# Patient Record
Sex: MALE | Race: BLACK OR AFRICAN AMERICAN | URBAN - METROPOLITAN AREA
[De-identification: names, ages, dates, MRNs, and addresses within clinical notes are randomized per-mention and may not be internally consistent; named-entity substitution may affect disease eponyms.]

---

## 2017-01-12 ENCOUNTER — TRANSFERRED RECORDS (OUTPATIENT)
Dept: HEALTH INFORMATION MANAGEMENT | Facility: CLINIC | Age: 57
End: 2017-01-12

## 2017-03-30 ENCOUNTER — TRANSFERRED RECORDS (OUTPATIENT)
Dept: HEALTH INFORMATION MANAGEMENT | Facility: CLINIC | Age: 57
End: 2017-03-30

## 2017-05-18 ENCOUNTER — TRANSFERRED RECORDS (OUTPATIENT)
Dept: HEALTH INFORMATION MANAGEMENT | Facility: CLINIC | Age: 57
End: 2017-05-18

## 2017-06-15 ENCOUNTER — TRANSFERRED RECORDS (OUTPATIENT)
Dept: HEALTH INFORMATION MANAGEMENT | Facility: CLINIC | Age: 57
End: 2017-06-15

## 2017-06-16 ENCOUNTER — TRANSFERRED RECORDS (OUTPATIENT)
Dept: HEALTH INFORMATION MANAGEMENT | Facility: CLINIC | Age: 57
End: 2017-06-16

## 2017-08-01 ENCOUNTER — TRANSFERRED RECORDS (OUTPATIENT)
Dept: HEALTH INFORMATION MANAGEMENT | Facility: CLINIC | Age: 57
End: 2017-08-01

## 2017-08-07 DIAGNOSIS — M54.16 LUMBAR RADICULAR PAIN: Primary | ICD-10-CM

## 2017-08-08 ENCOUNTER — HOSPITAL ENCOUNTER (OUTPATIENT)
Dept: GENERAL RADIOLOGY | Facility: CLINIC | Age: 57
End: 2017-08-08
Attending: NEUROLOGICAL SURGERY
Payer: COMMERCIAL

## 2017-08-08 ENCOUNTER — OFFICE VISIT (OUTPATIENT)
Dept: NEUROSURGERY | Facility: CLINIC | Age: 57
End: 2017-08-08
Attending: NEUROLOGICAL SURGERY
Payer: COMMERCIAL

## 2017-08-08 ENCOUNTER — HOSPITAL ENCOUNTER (OUTPATIENT)
Dept: MRI IMAGING | Facility: CLINIC | Age: 57
Discharge: HOME | End: 2017-08-08
Attending: NEUROLOGICAL SURGERY | Admitting: NEUROLOGICAL SURGERY
Payer: COMMERCIAL

## 2017-08-08 VITALS
HEART RATE: 80 BPM | SYSTOLIC BLOOD PRESSURE: 115 MMHG | OXYGEN SATURATION: 100 % | HEIGHT: 71 IN | WEIGHT: 222 LBS | DIASTOLIC BLOOD PRESSURE: 74 MMHG | TEMPERATURE: 97.4 F | BODY MASS INDEX: 31.08 KG/M2

## 2017-08-08 DIAGNOSIS — M54.16 LUMBAR RADICULAR PAIN: Primary | ICD-10-CM

## 2017-08-08 DIAGNOSIS — M54.16 LUMBAR RADICULAR PAIN: ICD-10-CM

## 2017-08-08 PROCEDURE — 72110 X-RAY EXAM L-2 SPINE 4/>VWS: CPT

## 2017-08-08 PROCEDURE — 99211 OFF/OP EST MAY X REQ PHY/QHP: CPT | Performed by: NEUROLOGICAL SURGERY

## 2017-08-08 PROCEDURE — 99204 OFFICE O/P NEW MOD 45 MIN: CPT | Performed by: NEUROLOGICAL SURGERY

## 2017-08-08 PROCEDURE — 72082 X-RAY EXAM ENTIRE SPI 2/3 VW: CPT

## 2017-08-08 PROCEDURE — 72148 MRI LUMBAR SPINE W/O DYE: CPT

## 2017-08-08 NOTE — PATIENT INSTRUCTIONS
-EMG right leg with Dr. Flores in [place] [address] on 8/9/17 8:30 am. Please check in at 8:20 am.     Do not wear Jewelry or metal.     Shower the day of the procedure.     Do not wear lotions or creams.      -We will contact you with results of EMG.       -Please call clinic at [phone] if you wish to proceed with surgery.

## 2017-08-08 NOTE — NURSING NOTE
Leobardo Brown is a 57 year old male who presents for:  Chief Complaint   Patient presents with     Neurologic Problem     referral from Dr. ABBY Trotter for  2nd opinion        Initial Vitals:  There were no vitals taken for this visit. There is no height or weight on file to calculate BMI.. There is no height or weight on file to calculate BSA. BP completed using cuff size: regular  Data Unavailable    Do you feel safe in your environment?  Yes  Do you need any refills today? No    Nursing Comments: referral from Dr. ABBY Trotter for  2nd opinion.  Patient rates his pain today as 9 right leg ankle      5 min. nursing intake time  Willa Jefferson CMA, AAS      Discharge plan:   -EMG right leg with Dr. Flores in [place] [address] on 8/9/17 8:30 am. Please check in at 8:20 am.     Do not wear Jewelry or metal.     Shower the day of the procedure.     Do not wear lotions or creams.      -We will contact you with results of EMG.       -Please call clinic at [phone] if you wish to proceed with surgery.       2 min. nursing discharge time  Willa Jefferson CMA, AAS

## 2017-08-08 NOTE — MR AVS SNAPSHOT
After Visit Summary   8/8/2017    Leobardo Brown    MRN: [id number]           Patient Information     Date Of Birth          1960        Visit Information        Provider Department      8/8/2017 9:00 AM David Jackson MD [facility]        Care Instructions    -EMG right leg with Dr. Flores in [place] [address] on 8/9/17 8:30 am. Please check in at 8:20 am.     Do not wear Jewelry or metal.     Shower the day of the procedure.     Do not wear lotions or creams.      -We will contact you with results of EMG.       -Please call clinic at [phone] if you wish to proceed with surgery.                 Follow-ups after your visit        Future tests that were ordered for you today     Open Future Orders        Priority Expected Expires Ordered    XR Spine Complete 2 Views Routine 8/8/2017 8/8/2018 8/8/2017    XR Lumbar Spine G/E 4 Views Routine 8/8/2017 8/8/2018 8/8/2017    MR Lumbar Spine w/o Contrast STAT  8/7/2018 8/7/2017            Who to contact     If you have questions or need follow up information about today's clinic visit or your schedule please contact [facility] directly at [phone].  Normal or non-critical lab and imaging results will be communicated to you by [employer], letter or phone within 4 business days after the clinic has received the results. If you do not hear from us within 7 days, please contact the clinic through Practice Management e-Toolst or phone. If you have a critical or abnormal lab result, we will notify you by phone as soon as possible.  Submit refill requests through StudyRoom or call your pharmacy and they will forward the refill request to us. Please allow 3 business days for your refill to be completed.          Additional Information About Your Visit        [employer] Information     StudyRoom lets you send messages to your doctor, view your test results, renew your prescriptions, schedule appointments and more. To sign up,  "go to [email or web address] . Click on \"Log in\" on the left side of the screen, which will take you to the Welcome page. Then click on \"Sign up Now\" on the right side of the page.     You will be asked to enter the access code listed below, as well as some personal information. Please follow the directions to create your username and password.     Your access code is: [email or web address]  Expires: 2017 10:39 AM     Your access code will  in 90 days. If you need help or a new code, please call your [place] clinic or [phone].        Care EveryWhere ID     This is your Care EveryWhere ID. This could be used by other organizations to access your [place] medical records  [id number]        Your Vitals Were     Pulse Temperature Height Pulse Oximetry BMI (Body Mass Index)       80 97.4  F (36.3  C) (Oral) 5' 11\" (1.803 m) 100% 30.96 kg/m2        Blood Pressure from Last 3 Encounters:   17 115/74    Weight from Last 3 Encounters:   17 222 lb (100.7 kg)              Today, you had the following     No orders found for display       Primary Care Provider Office Phone # Fax #    Luzma Hannah [phone]       [address] [address]        Equal Access to Services     [facility]: Hadii aad ku hadasho Sokyali, waaxda luqadaha, qaybta kaalmada adeegyada, kevin shelley hayariela cornejo . So [facility] [phone].    ATENCIÓN: Si habla español, tiene a hurst disposición servicios gratuitos de asistencia lingüística. Llame al [phone].    We comply with applicable federal civil rights laws and [place] laws. We do not discriminate on the basis of race, color, national origin, age, disability sex, sexual orientation or gender identity.            Thank you!     Thank you for choosing [facility]  for your care. Our goal is always to provide you with excellent care. Hearing back from our patients is one way we " can continue to improve our services. Please take a few minutes to complete the written survey that you may receive in the mail after your visit with us. Thank you!             Your Updated Medication List - Protect others around you: Learn how to safely use, store and throw away your medicines at www.disposemymeds.org.      Notice  As of 8/8/2017 10:39 AM    You have not been prescribed any medications.

## 2017-08-08 NOTE — PROGRESS NOTES
"[facility]  Neurosurgery Consult Note       CC: LBP and RLE pain    Primary care Provider: Luzma Hannah    Referring provider:   David Jackson MD  [facility]  [address] [address]      [place]: Leobardo Brown is a 57 year old male that presents to clinic for a third opinion with a complaint of intractable low back and RLE pain. The patient has been seen by Dr. Trotter at [facility] in [place], [place] and also in [place] by a \"Work Comp\" surgeon. He describes having severe low back and RLE pain in the L4, L5 and S1 distribution. His pain has not been resolved with PT, PMR care, BETSY x 3 and he has has a DCS trial. He feels the pain is progressively getting worse and affecting his AODL      No past medical history on file.    No past surgical history on file.    No current outpatient prescriptions on file.     No current facility-administered medications for this visit.        Allergies not on file    Social History     Social History     Marital status:      Spouse name: N/A     Number of children: N/A     Years of education: N/A     Social History Main Topics     Smoking status: Not on file     Smokeless tobacco: Not on file     Alcohol use Not on file     Drug use: Not on file     Sexual activity: Not on file     Other Topics Concern     Not on file     Social History Narrative       No family history on file.      Review Of Systems  Skin: negative  Eyes: negative  Ears/Nose/Throat: negative  Respiratory: No shortness of breath, dyspnea on exertion, cough, or hemoptysis  Cardiovascular: negative  Gastrointestinal: negative  Genitourinary: negative  Musculoskeletal: as above  Neurologic: as above  Psychiatric: negative  Hematologic/Lymphatic/Immunologic: negative  Endocrine: negative    B/P: Data Unavailable, T: Data Unavailable, P: Data Unavailable, R: Data Unavailable    Examination:  Awake  Alert  Oriented x 3  Speech " clear  Cranial nerves II - XII intact  Face symmetric    Limited ROM of back  Motor exam    RLE - iliopsoas 5/5, quads 5/5, hamstrings 5/5, dorsiflexion 5/5, plantar flexion 5/5, eversion 5/5, inversion 5/5, EHL 5/5   LLE -  iliopsoas 5/5, quads 5/5, hamstrings 5/5, dorsiflexion 5/5, plantar flexion 5/5, eversion 5/5, inversion 5/5, EHL 5/5  Sensation decreased in RLE in the L4/5/S1 distribution  Clonus negative  DTR 1+  Positive Lase'serenity's sign on the right at 50 degrees   Ambulation with kyphotic posture and he limps    Imaging:   MRI lumbar spine - multilevel DDD from L2-S1 with loss of lordosis and flat back with severe foraminal stenosis that is worse on the right at L5-S1 and L4-5 and likely entrapment of his roots. He has some left sided foraminal stenosis at L4-S1.         Post op spinopelvic calculations:        Sagittal vertical alignment - < 2 cm      Assessment/Plan:    Mr. Brown has multilevel DDD from L2-S1 with severe foraminal stenosis right > left at L4-5 and L5-S1. The pelvic incidence and lumbar lordosis mismatch is 13 degrees (normal +/- 10 cm) and he has a SVA of < 2 cm (normal < 5 cm). I think the majority of his symptoms are from his L4-S1 area with the nerve compression in the foramen. I discussed with him the different options for surgical intervention and I believe he would benefit from a L4-S1 decompression with complete facetectomies and transforaminal lumbar interbody fusion at L4-5 and L5-S1 to correct the 4 degree PI - LL mismatch and elevate the disk space to decompress the foramen and exiting roots. He also may need extension to L2 with instrumentation. He will discuss this with Dr. Trotter in [place], [place]    We will obtain a EMG/NCV of his RLE    He may call with ay other questions and/or concerns       David Jackson MD, MS, FAANS  Neurosurgeon  [facility]  Tel [phone]          Addendum:  EMG/NCV - right L5 root injury      David TORO  MD Manuel, MS, FAANS

## 2017-08-09 ENCOUNTER — TRANSFERRED RECORDS (OUTPATIENT)
Dept: HEALTH INFORMATION MANAGEMENT | Facility: CLINIC | Age: 57
End: 2017-08-09